# Patient Record
(demographics unavailable — no encounter records)

---

## 2018-04-21 NOTE — US
PROCEDURE:  OB Pelvic Ultrasound



HISTORY:

LLQ pain



LMP: 02/12/2018



COMPARISON:

Pelvic ultrasound dated 05/02/2010



FINDINGS:



UTERUS:

Gestational sac: Single intrauterine gestation. In sac diameter 0.0 

cm compatible with estimated gestational age of 9 weeks, 2 days. 



Yolk sac: Measures 0.4 cm. 



Fetal pole: Crown-rump length measures 3.0 cm compatible with 

estimated gestational age of 9 weeks, 6 days. 



Fetal Heart rate: 178 bpm.



Fetal age (Ultrasound estimated): 9 weeks, 4 days



Josephine-gestational hemorrhage: None.



Date of delivery (Ultrasound estimated) : 11/20/2018



Uterus measures 8.9 x 6.0 x 7.5 cm. Anteverted. Normal in size and 

appearance. Fibroid measuring 2.8 x 2.8 x 2.5 cm. 



CERVIX:

Measures 3.4 cm. Long and closed. No cervical abnormality seen.



RIGHT OVARY:

Measures 2.4 x 2.0 x 2.2 cm. Cyst measuring 1.3 x 1.2 x 1.0 cm. 

Normal flow. 



LEFT OVARY:

Measures 2.7 x 1.4 x 2.1 cm. No solid mass. Normal flow. 



FREE FLUID:

None.



OTHER FINDINGS:

None. 



IMPRESSION:

Single viable intrauterine gestation with average ultrasound age of 9 

weeks, 4 days.  Fetal heart rate 178 beats per minute.  Cervix long 

and closed. No free fluid. 



Uterine fibroid measuring 2.8 cm.

## 2018-04-21 NOTE — ED PDOC
HPI: Female  Pain


Time Seen by Provider: 18 09:11


Chief Complaint (Nursing): Female Genitourinary


Chief Complaint (Provider): Lower abdominal pain


History Per: Patient


History/Exam Limitations: no limitations


Additional Complaint(s): 





Pt  presents with lower abdominal pain X 3 days, had vaginal bleeding with 

clots 2 days ago that has since resolved.  Denies fever, nausea, vomiting, 

dysuria, hematuria.





Past Medical History


Reviewed: Nursing Documentation, Vital Signs


Vital Signs: 





 Last Vital Signs











Temp  98 F   18 08:51


 


Pulse  77   18 08:51


 


Resp      


 


BP  103/66   18 08:51


 


Pulse Ox  98   18 08:51














- Medical History


PMH: No Chronic Diseases





- Surgical History


Surgical History: No Surg Hx





- Family History


Family History: States: Unknown Family Hx





- Social History


Current smoker - smoking cessation education provided: No





- Immunization History


Hx Tetanus Toxoid Vaccination: No


Hx Influenza Vaccination: No


Hx Pneumococcal Vaccination: No





- Home Medications


Home Medications: 


 Ambulatory Orders











 Medication  Instructions  Recorded


 


Nitrofurantoin Macrocrystals 100 mg PO BID #13 cap 18





[Macrobid]  


 


Prenatal Vit Calc,Iron,Folic 1 each PO DAILY #30 tablet 18





[Prenatal Vitamins]  














- Allergies


Allergies/Adverse Reactions: 


 Allergies











Allergy/AdvReac Type Severity Reaction Status Date / Time


 


No Known Allergies Allergy   Verified 18 09:18














Review of Systems


Constitutional: Negative for: Fever, Chills


Cardiovascular: Negative for: Chest Pain, Palpitations


Respiratory: Negative for: Cough, Shortness of Breath


Gastrointestinal: Positive for: Abdominal Pain.  Negative for: Nausea, Vomiting

, Diarrhea


Genitourinary Female: Positive for: Vaginal Bleeding (Resolved).  Negative for: 

Dysuria, Hematuria, Vaginal Discharge


Musculoskeletal: Negative for: Back Pain


Skin: Negative for: Rash, Lesions


Neurological: Negative for: Headache





Physical Exam





- Reviewed


Nursing Documentation Reviewed: Yes


Vital Signs Reviewed: Yes





- Physical Exam


Appears: Positive for: Well, No Acute Distress


Skin: Positive for: Normal Color, Warm, Dry


Eye Exam: Positive for: Normal appearance, EOMI, PERRL


Cardiovascular/Chest: Positive for: Regular Rate, Rhythm


Respiratory: Positive for: Normal Breath Sounds


Gastrointestinal/Abdominal: Positive for: Bowel Sounds, Soft, Tenderness (LLQ).

  Negative for: Mass, Distended, Guarding, Rebound


Extremity: Positive for: Normal ROM


Neurologic/Psych: Positive for: Alert, Oriented





- Laboratory Results


Result Diagrams: 


 18 11:00





 18 11:00





- ECG


O2 Sat by Pulse Oximetry: 98





Medical Decision Making


Medical Decision Makin yo pregnant female with LLQ pain and resolved vaginal bleeding.


- labs


- pelvic ultrasound


- Tylenol





Accession No. : [id number]


Patient Name / ID : CANTU YURIDIA  / [id number]


Exam Date : 2018 12:33:23 ( Approved )


Study Comment : 


Sex / Age : F  / [id number]





Creator : Santiago Andrea MD


Dictator : Santiago Andrea MD


 : 


Approver : Santiago Andrea MD


Approver2 : 





Report Date : 2018 12:16:19


My Comment : 


********************************************************************************

***





PROCEDURE:  OB Pelvic Ultrasound





HISTORY:


LLQ pain





LMP: 2018





COMPARISON:


Pelvic ultrasound dated 2010





FINDINGS:





UTERUS:


Gestational sac: Single intrauterine gestation. In sac diameter 0.0 cm 

compatible with estimated gestational age of 9 weeks, 2 days. 





Yolk sac: Measures 0.4 cm. 





Fetal pole: Crown-rump length measures 3.0 cm compatible with estimated 

gestational age of 9 weeks, 6 days. 





Fetal Heart rate: 178 bpm.





Fetal age (Ultrasound estimated): 9 weeks, 4 days





Josephine-gestational hemorrhage: None.





Date of delivery (Ultrasound estimated) : 2018





Uterus measures 8.9 x 6.0 x 7.5 cm. Anteverted. Normal in size and appearance. 

Fibroid measuring 2.8 x 2.8 x 2.5 cm. 





CERVIX:


Measures 3.4 cm. Long and closed. No cervical abnormality seen.





RIGHT OVARY:


Measures 2.4 x 2.0 x 2.2 cm. Cyst measuring 1.3 x 1.2 x 1.0 cm. Normal flow. 





LEFT OVARY:


Measures 2.7 x 1.4 x 2.1 cm. No solid mass. Normal flow. 





FREE FLUID:


None.





OTHER FINDINGS:


None. 





IMPRESSION:


Single viable intrauterine gestation with average ultrasound age of 9 weeks, 4 

days.  Fetal heart rate 178 beats per minute.  Cervix long and closed. No free 

fluid. 





Uterine fibroid measuring 2.8 cm.





Disposition





- Clinical Impression


Clinical Impression: 


 Threatened , UTI in pregnancy








- Disposition


[facility]errals[facility] 


[facility]


Disposition: Routine/Home


Disposition Time: 13:12


Condition: STABLE


Prescriptions: 


Nitrofurantoin Macrocrystals [Macrobid] 100 mg PO BID #13 cap


Prenatal Vit Calc,Iron,Folic [Prenatal Vitamins] 1 each PO DAILY #30 tablet


Instructions:  Threatened Miscarriage, Urinary Tract Infection, Adult (DC)


Forms:  [facility] (Frisian)


Print Language: Faroese